# Patient Record
Sex: FEMALE | Race: WHITE | HISPANIC OR LATINO
[De-identification: names, ages, dates, MRNs, and addresses within clinical notes are randomized per-mention and may not be internally consistent; named-entity substitution may affect disease eponyms.]

---

## 2017-12-18 ENCOUNTER — RESULT REVIEW (OUTPATIENT)
Age: 52
End: 2017-12-18

## 2017-12-18 ENCOUNTER — OUTPATIENT (OUTPATIENT)
Dept: OUTPATIENT SERVICES | Facility: HOSPITAL | Age: 52
LOS: 1 days | Discharge: HOME | End: 2017-12-18

## 2017-12-18 ENCOUNTER — APPOINTMENT (OUTPATIENT)
Dept: INTERNAL MEDICINE | Facility: CLINIC | Age: 52
End: 2017-12-18

## 2017-12-18 VITALS
BODY MASS INDEX: 30.29 KG/M2 | SYSTOLIC BLOOD PRESSURE: 107 MMHG | HEART RATE: 73 BPM | RESPIRATION RATE: 17 BRPM | WEIGHT: 193 LBS | HEIGHT: 67 IN | DIASTOLIC BLOOD PRESSURE: 72 MMHG

## 2017-12-18 DIAGNOSIS — M51.9 UNSPECIFIED THORACIC, THORACOLUMBAR AND LUMBOSACRAL INTERVERTEBRAL DISC DISORDER: ICD-10-CM

## 2017-12-18 DIAGNOSIS — Z78.9 OTHER SPECIFIED HEALTH STATUS: ICD-10-CM

## 2017-12-18 DIAGNOSIS — Z83.3 FAMILY HISTORY OF DIABETES MELLITUS: ICD-10-CM

## 2017-12-18 DIAGNOSIS — M50.90 CERVICAL DISC DISORDER, UNSPECIFIED, UNSPECIFIED CERVICAL REGION: ICD-10-CM

## 2017-12-18 DIAGNOSIS — Z87.19 PERSONAL HISTORY OF OTHER DISEASES OF THE DIGESTIVE SYSTEM: ICD-10-CM

## 2017-12-18 DIAGNOSIS — Z87.81 PERSONAL HISTORY OF (HEALED) TRAUMATIC FRACTURE: ICD-10-CM

## 2017-12-18 DIAGNOSIS — Z82.49 FAMILY HISTORY OF ISCHEMIC HEART DISEASE AND OTHER DISEASES OF THE CIRCULATORY SYSTEM: ICD-10-CM

## 2017-12-18 DIAGNOSIS — G51.0 BELL'S PALSY: ICD-10-CM

## 2017-12-19 LAB
ALBUMIN SERPL-MCNC: 3.9 G/DL
ALBUMIN/GLOB SERPL: 1.5
ALP SERPL-CCNC: 86 IU/L
ALT SERPL-CCNC: 25 IU/L
ANION GAP SERPL CALC-SCNC: 6 MEQ/L
APPEARANCE UR: CLEAR
AST SERPL-CCNC: 17 IU/L
BACTERIA URNS QL MICRO: ABNORMAL
BASOPHILS # BLD: 0.03 TH/MM3
BASOPHILS NFR BLD: 0.6 %
BILIRUB SERPL-MCNC: 0.7 MG/DL
BILIRUB UR QL STRIP: NEGATIVE
BUN SERPL-MCNC: 11 MG/DL
BUN/CREAT SERPL: 22.4 %
CALCIUM SERPL-MCNC: 9.3 MG/DL
CHLORIDE SERPL-SCNC: 108 MEQ/L
CHOLEST SERPL-MCNC: 130 MG/DL
CO2 SERPL-SCNC: 25 MEQ/L
COLOR UR: YELLOW
CREAT SERPL-MCNC: 0.49 MG/DL
DIFFERENTIAL METHOD BLD: NORMAL
EOSINOPHIL # BLD: 0.09 TH/MM3
EOSINOPHIL NFR BLD: 1.7 %
ERYTHROCYTE [DISTWIDTH] IN BLOOD BY AUTOMATED COUNT: 14.1 %
ESTIMATED AVERGAGE GLUCOSE (NORTH): 100 MG/DL
GFR SERPL CREATININE-BSD FRML MDRD: 132
GLUCOSE SERPL-MCNC: 91 MG/DL
GLUCOSE UR STRIP-MCNC: NEGATIVE MG/DL
GRANULOCYTES # BLD: 2.83 TH/MM3
GRANULOCYTES NFR BLD: 52.8 %
HBA1C MFR BLD: 5.1 %
HCT VFR BLD AUTO: 39.4 %
HDLC SERPL-MCNC: 66 MG/DL
HDLC SERPL: 1.97
HGB BLD-MCNC: 12.9 G/DL
HGB UR QL STRIP: ABNORMAL
IMM GRANULOCYTES # BLD: 0.01 TH/MM3
IMM GRANULOCYTES NFR BLD: 0.2 %
KETONES UR STRIP-MCNC: NEGATIVE MG/DL
LDLC SERPL DIRECT ASSAY-MCNC: 47 MG/DL
LYMPHOCYTES # BLD: 2.02 TH/MM3
LYMPHOCYTES NFR BLD: 37.8 %
MCH RBC QN AUTO: 29.5 PG
MCHC RBC AUTO-ENTMCNC: 32.7 G/DL
MCV RBC AUTO: 90.2 FL
MONOCYTES # BLD: 0.37 TH/MM3
MONOCYTES NFR BLD: 6.9 %
NITRITE UR QL STRIP: NEGATIVE
PH UR STRIP: 6.5
PLATELET # BLD: 299 TH/MM3
PMV BLD AUTO: 10.7 FL
POTASSIUM SERPL-SCNC: 4.2 MMOL/L
PROT SERPL-MCNC: 6.5 G/DL
PROT UR STRIP-MCNC: NEGATIVE MG/DL
RBC # BLD AUTO: 4.37 MIL/MM3
RBC #/AREA URNS HPF: NORMAL P/HPF
SODIUM SERPL-SCNC: 139 MEQ/L
SP GR UR STRIP: 1.01
T3 SERPL-MCNC: 120 NG/DL
T4 FREE SERPL-MCNC: 2.7 NG/DL
TRIGL SERPL-MCNC: 62 MG/DL
TSH SERPL DL<=0.005 MIU/L-ACNC: < 0.01 UIU/ML
URINE COMP/EPITH (NORTH): ABNORMAL
UROBILINOGEN UR STRIP-MCNC: 0.2 MG/DL
VLDLC SERPL-MCNC: 12 MG/DL
WBC # BLD: 5.35 TH/MM3
WBC URNS QL MICRO: NEGATIVE

## 2017-12-20 ENCOUNTER — RESULT REVIEW (OUTPATIENT)
Age: 52
End: 2017-12-20

## 2017-12-20 LAB
25(OH)D3 SERPL-MCNC: 33 NG/ML
VITAMIN D2 SERPL-MCNC: <4 NG/ML
VITAMIN D3 SERPL-MCNC: 33 NG/ML

## 2017-12-26 ENCOUNTER — OTHER (OUTPATIENT)
Age: 52
End: 2017-12-26

## 2018-01-02 LAB — BACTERIA UR CULT: NORMAL

## 2018-01-16 ENCOUNTER — APPOINTMENT (OUTPATIENT)
Dept: INTERNAL MEDICINE | Facility: CLINIC | Age: 53
End: 2018-01-16

## 2018-01-16 ENCOUNTER — OUTPATIENT (OUTPATIENT)
Dept: OUTPATIENT SERVICES | Facility: HOSPITAL | Age: 53
LOS: 1 days | Discharge: HOME | End: 2018-01-16

## 2018-01-16 VITALS
HEIGHT: 67 IN | DIASTOLIC BLOOD PRESSURE: 83 MMHG | SYSTOLIC BLOOD PRESSURE: 129 MMHG | BODY MASS INDEX: 29.82 KG/M2 | HEART RATE: 74 BPM | WEIGHT: 190 LBS

## 2018-01-16 DIAGNOSIS — E73.9 LACTOSE INTOLERANCE, UNSPECIFIED: ICD-10-CM

## 2018-01-16 DIAGNOSIS — Z86.59 PERSONAL HISTORY OF OTHER MENTAL AND BEHAVIORAL DISORDERS: ICD-10-CM

## 2018-01-16 RX ORDER — MULTIVITAMIN
CAPSULE ORAL
Refills: 0 | Status: COMPLETED | COMMUNITY
End: 2018-01-16

## 2018-01-16 RX ORDER — PHENAZOPYRIDINE HYDROCHLORIDE 200 MG/1
200 TABLET ORAL 3 TIMES DAILY
Qty: 9 | Refills: 0 | Status: COMPLETED | COMMUNITY
Start: 2017-12-20 | End: 2018-01-16

## 2018-02-06 ENCOUNTER — OUTPATIENT (OUTPATIENT)
Dept: OUTPATIENT SERVICES | Facility: HOSPITAL | Age: 53
LOS: 1 days | Discharge: HOME | End: 2018-02-06

## 2018-02-06 ENCOUNTER — OTHER (OUTPATIENT)
Age: 53
End: 2018-02-06

## 2018-02-06 DIAGNOSIS — E06.3 AUTOIMMUNE THYROIDITIS: ICD-10-CM

## 2018-02-09 ENCOUNTER — APPOINTMENT (OUTPATIENT)
Dept: OTOLARYNGOLOGY | Facility: CLINIC | Age: 53
End: 2018-02-09
Payer: COMMERCIAL

## 2018-02-09 VITALS — HEIGHT: 67 IN | WEIGHT: 191 LBS | BODY MASS INDEX: 29.98 KG/M2

## 2018-02-09 VITALS — HEIGHT: 67 IN | BODY MASS INDEX: 29.82 KG/M2 | WEIGHT: 190 LBS

## 2018-02-09 DIAGNOSIS — F45.8 OTHER SOMATOFORM DISORDERS: ICD-10-CM

## 2018-02-09 DIAGNOSIS — Z87.81 PERSONAL HISTORY OF (HEALED) TRAUMATIC FRACTURE: ICD-10-CM

## 2018-02-09 DIAGNOSIS — I69.30 UNSPECIFIED SEQUELAE OF CEREBRAL INFARCTION: ICD-10-CM

## 2018-02-09 DIAGNOSIS — H61.22 IMPACTED CERUMEN, LEFT EAR: ICD-10-CM

## 2018-02-09 DIAGNOSIS — M26.622 ARTHRALGIA OF LEFT TEMPOROMANDIBULAR JOINT: ICD-10-CM

## 2018-02-09 PROCEDURE — 69210 REMOVE IMPACTED EAR WAX UNI: CPT

## 2018-02-09 PROCEDURE — 99204 OFFICE O/P NEW MOD 45 MIN: CPT | Mod: 25

## 2018-02-09 RX ORDER — IBUPROFEN 800 MG/1
800 TABLET ORAL
Qty: 60 | Refills: 2 | Status: ACTIVE | COMMUNITY
Start: 2018-02-09 | End: 1900-01-01

## 2018-02-20 ENCOUNTER — APPOINTMENT (OUTPATIENT)
Dept: INTERNAL MEDICINE | Facility: CLINIC | Age: 53
End: 2018-02-20

## 2018-02-21 ENCOUNTER — APPOINTMENT (OUTPATIENT)
Dept: ENDOCRINOLOGY | Facility: CLINIC | Age: 53
End: 2018-02-21

## 2018-07-16 ENCOUNTER — FORM ENCOUNTER (OUTPATIENT)
Age: 53
End: 2018-07-16

## 2018-07-17 ENCOUNTER — OUTPATIENT (OUTPATIENT)
Dept: OUTPATIENT SERVICES | Facility: HOSPITAL | Age: 53
LOS: 1 days | Discharge: HOME | End: 2018-07-17

## 2018-07-17 ENCOUNTER — RESULT CHARGE (OUTPATIENT)
Age: 53
End: 2018-07-17

## 2018-07-17 ENCOUNTER — APPOINTMENT (OUTPATIENT)
Age: 53
End: 2018-07-17

## 2018-07-17 VITALS
HEART RATE: 80 BPM | WEIGHT: 192.6 LBS | HEIGHT: 67 IN | DIASTOLIC BLOOD PRESSURE: 68 MMHG | BODY MASS INDEX: 30.23 KG/M2 | SYSTOLIC BLOOD PRESSURE: 100 MMHG | TEMPERATURE: 98.2 F

## 2018-07-17 DIAGNOSIS — R05 COUGH: ICD-10-CM

## 2018-07-17 DIAGNOSIS — E06.3 OTHER THYROTOXICOSIS W/OUT THYROTOXIC CRISIS OR STORM: ICD-10-CM

## 2018-07-17 DIAGNOSIS — Z87.09 PERSONAL HISTORY OF OTHER DISEASES OF THE RESPIRATORY SYSTEM: ICD-10-CM

## 2018-07-17 DIAGNOSIS — Z00.00 ENCOUNTER FOR GENERAL ADULT MEDICAL EXAMINATION W/OUT ABNORMAL FINDINGS: ICD-10-CM

## 2018-07-17 DIAGNOSIS — E05.80 OTHER THYROTOXICOSIS W/OUT THYROTOXIC CRISIS OR STORM: ICD-10-CM

## 2018-07-17 NOTE — ASSESSMENT
[FreeTextEntry1] : 52 yo female with a pmhx of Hasimoto's thyroiditis presents for 2 mos of chronic productive cough with thick yellow, greenish sputum, occasional hemoptysis, and congestion. \par \par 1.  Chronic cough\par -Rapid strep test was negative; will send for culture\par -Will get chest xray \par \par 2. Sore throat\par -Post-nasal drip\par -Provided pt education about maintaining hydration\par \par 3. Hasimoto's thyroidits\par -Previous labs from 12/2017 showed hyperthyroidism with Hashimoto'ss (High T4, Low TSH)\par -Check T4, TSH\par - Pt takes bovine thyroid supplement and f/up with endo. \par \par 4. HCM\par -Refer to GI for colonoscopy\par -Refer to GYN for pap\par -Mammo up to date (in July)\par \par RTC in 3 mos\par

## 2018-07-17 NOTE — PHYSICAL EXAM
[No Acute Distress] : no acute distress [Well Nourished] : well nourished [Well Developed] : well developed [Well-Appearing] : well-appearing [Normal Sclera/Conjunctiva] : normal sclera/conjunctiva [PERRL] : pupils equal round and reactive to light [EOMI] : extraocular movements intact [No JVD] : no jugular venous distention [Supple] : supple [No Lymphadenopathy] : no lymphadenopathy [Thyroid Normal, No Nodules] : the thyroid was normal and there were no nodules present [No Accessory Muscle Use] : no accessory muscle use [Normal Rate] : normal rate  [Regular Rhythm] : with a regular rhythm [Normal S1, S2] : normal S1 and S2 [No Murmur] : no murmur heard [No Carotid Bruits] : no carotid bruits [No Abdominal Bruit] : a ~M bruit was not heard ~T in the abdomen [No Varicosities] : no varicosities [Pedal Pulses Present] : the pedal pulses are present [No Edema] : there was no peripheral edema [No Extremity Clubbing/Cyanosis] : no extremity clubbing/cyanosis [No Palpable Aorta] : no palpable aorta [Soft] : abdomen soft [Non Tender] : non-tender [Non-distended] : non-distended [No Masses] : no abdominal mass palpated [No HSM] : no HSM [Normal Bowel Sounds] : normal bowel sounds [Normal Posterior Cervical Nodes] : no posterior cervical lymphadenopathy [Normal Anterior Cervical Nodes] : no anterior cervical lymphadenopathy [No CVA Tenderness] : no CVA  tenderness [No Spinal Tenderness] : no spinal tenderness [No Joint Swelling] : no joint swelling [Grossly Normal Strength/Tone] : grossly normal strength/tone [No Rash] : no rash [Normal Gait] : normal gait [Coordination Grossly Intact] : coordination grossly intact [No Focal Deficits] : no focal deficits [Deep Tendon Reflexes (DTR)] : deep tendon reflexes were 2+ and symmetric [Normal Affect] : the affect was normal [Normal Insight/Judgement] : insight and judgment were intact [Clear to Auscultation] : lungs were clear to auscultation bilaterally [de-identified] : Mild erythema, but no polyps or pharyngeal exudates [de-identified] : No lymphadenopathy

## 2018-07-17 NOTE — END OF VISIT
[FreeTextEntry3] : case d/w MS 4 (Pari Clark)  [Time Spent: ___ minutes] : I have spent [unfilled] minutes of face to face time with the patient
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
## 2018-07-17 NOTE — REVIEW OF SYSTEMS
[Fatigue] : fatigue [Cough] : cough [Negative] : Heme/Lymph [FreeTextEntry4] : Chronic productive cough with greenish, yellow mucous, and occasional hemoptysis x 2 mos. Post-nasal drip, itching, and sore throat

## 2018-07-18 DIAGNOSIS — E06.3 AUTOIMMUNE THYROIDITIS: ICD-10-CM

## 2018-07-18 DIAGNOSIS — J02.9 ACUTE PHARYNGITIS, UNSPECIFIED: ICD-10-CM

## 2018-07-18 DIAGNOSIS — R05 COUGH: ICD-10-CM

## 2018-07-18 LAB — S PYO AG SPEC QL IA: NORMAL

## 2018-07-20 ENCOUNTER — APPOINTMENT (OUTPATIENT)
Dept: INTERNAL MEDICINE | Facility: CLINIC | Age: 53
End: 2018-07-20

## 2018-07-20 LAB — BACTERIA THROAT CULT: NORMAL

## 2018-07-23 PROBLEM — F45.8 BRUXISM: Status: ACTIVE | Noted: 2018-02-09

## 2020-12-16 PROBLEM — Z87.09 HISTORY OF SORE THROAT: Status: RESOLVED | Noted: 2018-07-17 | Resolved: 2020-12-16
